# Patient Record
Sex: MALE | URBAN - METROPOLITAN AREA
[De-identification: names, ages, dates, MRNs, and addresses within clinical notes are randomized per-mention and may not be internally consistent; named-entity substitution may affect disease eponyms.]

---

## 2020-08-18 ENCOUNTER — APPOINTMENT (OUTPATIENT)
Dept: URBAN - METROPOLITAN AREA SURGERY 8 | Age: 60
Setting detail: DERMATOLOGY
End: 2020-08-18

## 2020-08-18 DIAGNOSIS — R21 RASH AND OTHER NONSPECIFIC SKIN ERUPTION: ICD-10-CM

## 2020-08-18 PROCEDURE — OTHER ADDITIONAL NOTES: OTHER

## 2020-08-18 PROCEDURE — 99202 OFFICE O/P NEW SF 15 MIN: CPT

## 2020-08-18 NOTE — HPI: RASH
What Type Of Note Output Would You Prefer (Optional)?: Standard Output
Is The Patient Presenting As Previously Scheduled?: Yes
How Severe Is Your Rash?: severe
Is This A New Presentation, Or A Follow-Up?: Rash
Additional History: ER told them hand foot and mouth, prescribed prednisone, IM kenalog.

## 2021-01-11 ENCOUNTER — APPOINTMENT (OUTPATIENT)
Dept: URBAN - METROPOLITAN AREA SURGERY 8 | Age: 61
Setting detail: DERMATOLOGY
End: 2021-01-11

## 2021-01-11 DIAGNOSIS — D17 BENIGN LIPOMATOUS NEOPLASM: ICD-10-CM

## 2021-01-11 PROBLEM — D17.22 BENIGN LIPOMATOUS NEOPLASM OF SKIN AND SUBCUTANEOUS TISSUE OF LEFT ARM: Status: ACTIVE | Noted: 2021-01-11

## 2021-01-11 PROCEDURE — OTHER COUNSELING: OTHER

## 2021-01-11 PROCEDURE — 99212 OFFICE O/P EST SF 10 MIN: CPT

## 2021-01-11 PROCEDURE — OTHER CONSULTATION EXCISION: OTHER

## 2021-01-11 PROCEDURE — OTHER DEFER: OTHER

## 2021-01-11 ASSESSMENT — LOCATION SIMPLE DESCRIPTION DERM: LOCATION SIMPLE: LEFT FOREARM

## 2021-01-11 ASSESSMENT — LOCATION ZONE DERM: LOCATION ZONE: ARM

## 2021-01-11 ASSESSMENT — LOCATION DETAILED DESCRIPTION DERM: LOCATION DETAILED: LEFT PROXIMAL DORSAL FOREARM

## 2021-02-15 ENCOUNTER — APPOINTMENT (OUTPATIENT)
Dept: URBAN - METROPOLITAN AREA SURGERY 8 | Age: 61
Setting detail: DERMATOLOGY
End: 2021-02-23

## 2021-02-15 DIAGNOSIS — D17 BENIGN LIPOMATOUS NEOPLASM: ICD-10-CM

## 2021-02-15 PROBLEM — D48.5 NEOPLASM OF UNCERTAIN BEHAVIOR OF SKIN: Status: ACTIVE | Noted: 2021-02-15

## 2021-02-15 PROCEDURE — OTHER EXCISION: OTHER

## 2021-02-15 PROCEDURE — 12032 INTMD RPR S/A/T/EXT 2.6-7.5: CPT

## 2021-02-15 PROCEDURE — 11401 EXC TR-EXT B9+MARG 0.6-1 CM: CPT

## 2021-02-15 PROCEDURE — OTHER MIPS QUALITY: OTHER

## 2021-02-15 PROCEDURE — OTHER ADDITIONAL NOTES: OTHER

## 2021-02-15 ASSESSMENT — LOCATION SIMPLE DESCRIPTION DERM: LOCATION SIMPLE: LEFT FOREARM

## 2021-02-15 ASSESSMENT — LOCATION DETAILED DESCRIPTION DERM: LOCATION DETAILED: LEFT DISTAL DORSAL FOREARM

## 2021-02-15 ASSESSMENT — LOCATION ZONE DERM: LOCATION ZONE: ARM

## 2021-02-15 NOTE — PROCEDURE: EXCISION
Path Notes Override (Will Replace All Of The Above Text): subcutaneous mass was not well encapsulated and was unable to be excised entirely. representative sample (approximately 1 cm) sent to lab and site closed

## 2021-02-15 NOTE — PROCEDURE: ADDITIONAL NOTES
Additional Notes: unable to excise completely so representative sample sent for histology and site closed. will await path results and then refer out for excision
Detail Level: Detailed
Render Risk Assessment In Note?: no

## 2023-06-26 NOTE — PROCEDURE: ADDITIONAL NOTES
Additional Notes: Ddx includes erythema multiforme, LCV (including HSP), drug eruption (SDRIFE but no inciting agents on history) vs other\\n\\nDiscussed biopsy for H&E and DIF to clarify diagnosis but given other systemic complaints and no recent CBC or workup, recommended evaluation at [facility] ED with derm consult, labs, and supportive care. Will defer biopsy for now as they will likely be able to process and read faster than I would be able to as DIF would require send out so would not get results until early next week at  the earliest
Detail Level: Zone
Post-Care Instructions: I reviewed with the patient in detail post-care instructions. Patient is to avoid sunlight for the next 2 days, and wear sun protection. Patients may expect sunburn like redness, discomfort and scabbing.